# Patient Record
Sex: FEMALE | Race: WHITE
[De-identification: names, ages, dates, MRNs, and addresses within clinical notes are randomized per-mention and may not be internally consistent; named-entity substitution may affect disease eponyms.]

---

## 2023-05-04 ENCOUNTER — APPOINTMENT (OUTPATIENT)
Dept: OTOLARYNGOLOGY | Facility: CLINIC | Age: 48
End: 2023-05-04
Payer: COMMERCIAL

## 2023-05-04 VITALS — WEIGHT: 110 LBS | HEIGHT: 64 IN | BODY MASS INDEX: 18.78 KG/M2

## 2023-05-04 DIAGNOSIS — H91.93 UNSPECIFIED HEARING LOSS, BILATERAL: ICD-10-CM

## 2023-05-04 DIAGNOSIS — H61.23 IMPACTED CERUMEN, BILATERAL: ICD-10-CM

## 2023-05-04 DIAGNOSIS — H93.8X3 OTHER SPECIFIED DISORDERS OF EAR, BILATERAL: ICD-10-CM

## 2023-05-04 PROCEDURE — 99024 POSTOP FOLLOW-UP VISIT: CPT

## 2023-05-04 PROCEDURE — 69210 REMOVE IMPACTED EAR WAX UNI: CPT

## 2023-05-09 NOTE — HISTORY OF PRESENT ILLNESS
[de-identified] : 47 years old female patient with history of Change in hearing and impacted cerumen since 05/01/2022.  Patient is present today in the office with bilateral cerumen impaction

## 2023-05-09 NOTE — REVIEW OF SYSTEMS
[Patient Intake Form Reviewed] : Patient intake form was reviewed [Ear Pain] : ear pain [Hearing Loss] : hearing loss [As Noted in HPI] : as noted in HPI [Negative] : Heme/Lymph [Nasal Congestion] : no nasal congestion [de-identified] : Change in  hearing

## 2023-05-09 NOTE — CONSULT LETTER
[Dear  ___] : Dear  [unfilled], [Consult Letter:] : I had the pleasure of evaluating your patient, [unfilled]. [Please see my note below.] : Please see my note below. [Consult Closing:] : Thank you very much for allowing me to participate in the care of this patient.  If you have any questions, please do not hesitate to contact me. [Sincerely,] : Sincerely, [FreeTextEntry3] : Rajeev Mai MD, FACS\par Professor of Otolaryngology, [employer] [employer] at [facility]\par Director, [facility], Department of Otolaryngology, [facility]\par , Head [facility] Neck Service Line, [facility]\par

## 2023-05-09 NOTE — PHYSICAL EXAM
[Normal] : mucosa is normal [Midline] : trachea located in midline position [de-identified] :  bilateral cerumen impaction removed

## 2023-05-09 NOTE — REASON FOR VISIT
[Initial Evaluation] : an initial evaluation for [FreeTextEntry2] : Change in hearing and impacted cerumen since 05/01/2022  Patient states her level of severity is a level 8 out of 10 and it occurs constant.  Patient states nothing helps to improve or worsens  her Change in hearing and impacted cerumen since 05/01/2022

## 2023-05-09 NOTE — PROCEDURE
[Cerumen Impaction] : Cerumen Impaction [] : Removal of Cerumen [FreeTextEntry5] : Sanders suction #5, Ear curette, Ear Alligator